# Patient Record
Sex: MALE | Race: WHITE | NOT HISPANIC OR LATINO | Employment: FULL TIME | URBAN - METROPOLITAN AREA
[De-identification: names, ages, dates, MRNs, and addresses within clinical notes are randomized per-mention and may not be internally consistent; named-entity substitution may affect disease eponyms.]

---

## 2021-08-21 ENCOUNTER — HOSPITAL ENCOUNTER (EMERGENCY)
Facility: CLINIC | Age: 51
Discharge: HOME | End: 2021-08-21
Attending: EMERGENCY MEDICINE | Admitting: EMERGENCY MEDICINE
Payer: COMMERCIAL

## 2021-08-21 VITALS
OXYGEN SATURATION: 100 % | BODY MASS INDEX: 24.36 KG/M2 | WEIGHT: 200 LBS | TEMPERATURE: 98.5 F | HEART RATE: 77 BPM | DIASTOLIC BLOOD PRESSURE: 81 MMHG | HEIGHT: 76 IN | SYSTOLIC BLOOD PRESSURE: 123 MMHG | RESPIRATION RATE: 16 BRPM

## 2021-08-21 DIAGNOSIS — S81.811A LACERATION OF RIGHT LOWER EXTREMITY, INITIAL ENCOUNTER: ICD-10-CM

## 2021-08-21 PROCEDURE — 12001 RPR S/N/AX/GEN/TRNK 2.5CM/<: CPT

## 2021-08-21 PROCEDURE — 99283 EMERGENCY DEPT VISIT LOW MDM: CPT

## 2021-08-21 ASSESSMENT — MIFFLIN-ST. JEOR: SCORE: 1873.69

## 2021-08-21 ASSESSMENT — ENCOUNTER SYMPTOMS
WOUND: 1
JOINT SWELLING: 0
NUMBNESS: 0
FEVER: 0

## 2021-08-22 NOTE — ED NOTES
Emergency Department Technician Wound Irrigation Note:    8/21/2021    9:38 PM      Wound location:  Right shin    Irrigation Fluid: Normal Saline    Estimated Irrigation Volume (60 mL fluid per cm): 240 mL    KELLI LOVE

## 2021-08-22 NOTE — ED PROVIDER NOTES
"  History   Chief Complaint:  Fall and Leg Injury     The history is provided by the patient.      Emre Flores is a 51 year old male who presents with abrasions following a fall while hiking at [employer]. He explains that he was following his 10 year old son onto some rocks and stepped on a slippery stone that caused him to fall about a foot. The abrasions stopped bleeding within a minute. This happened around 2-2:30 PM this afternoon. He was able to walk around for another 45 minutes and drive home and shower following the fall. When he got home, he bandaged it following his shower and the abrasions soaked through the bandage. He was experiencing some pain from the ankle, up the shin, to the knee, but feels okay upon evaluation in the ED. He denies any head trauma. He does not use any blood thinners.    The patient states that his tetanus is up to date.      Review of Systems   Constitutional: Negative for fever.   Musculoskeletal: Negative for joint swelling.   Skin: Positive for wound.   Neurological: Negative for numbness.     Allergies:  No Known Allergies    Medications:  The patient is not currently taking any daily medications.    Past Medical History:    The patient is healthy and did not have any past medical history to report.     Social History:  Presents alone  No known history of tobacco or alcohol use    Physical Exam     Patient Vitals for the past 24 hrs:   BP Temp Temp src Pulse Resp SpO2 Height Weight   08/21/21 2105 123/81 98.5  F (36.9  C) Temporal 77 16 100 % 1.93 m (6' 4\") 90.7 kg (200 lb)       Physical Exam  General: Alert, interactive.   Head:  Scalp is atraumatic  Eyes:  The pupils are equal, round, and reactive to light    EOM's intact    No scleral icterus  ENT:      Nose:  The external nose is normal  Ears:  External ears are normal  Mouth/Throat: The oropharynx is normal    Mucus membranes are moist      Neck:  Normal range of motion.      There is no rigidity.    Trachea is in " the midline         CV:  Regular rate and rhythm    No murmur   Resp:  Breath sounds are clear bilaterally    Non-labored, no retractions or accessory muscle use      MS:  Normal strength in all 4 extremities  Skin:  Warm and dry, No rash or lesions noted. Abrasion to the right knee, superficial. Abrasion with a 1 cm laceration to the right anterior tibial ridge.    Neuro: Strength 5/5 x4.  Sensation intact in the right lower extremity.      GCS: 15  Psych:  Awake. Alert.  Normal affect.      Appropriate interactions.    Emergency Department Course     Procedures    Laceration Repair        LACERATION:  A simple and superficial clean 1 cm laceration.      LOCATION:  right anterior tibial ridge      FUNCTION:  Distally sensation, circulation, motor and tendon function are intact.      ANESTHESIA:  Local using 0.5% bupivacaine total of 5 mLs      PREPARATION:  Irrigation with Normal Saline and Shur Clens      DEBRIDEMENT:  no debridement      CLOSURE:  Wound was closed with One Layer.  Skin closed with 1 x 4.0 Ethilon using horizontal mattress sutures.    Emergency Department Course:    Reviewed:  I reviewed nursing notes, vitals and past medical history.     Assessments:  2121 I obtained history and examined the patient as noted above.   2148 I rechecked the patient and explained findings.    Disposition:  The patient was discharged to home.     Impression & Plan     Medical Decision Making:  The patient presented with a laceration.  The wound was carefully evaluated and explored.  The laceration was closed with sutures as noted above.  There is no evidence of muscular, tendon, or bony damage with this laceration.  Possible complications (infection, scarring) were reviewed with the patient.  Follow up with primary care will be indicated for suture removal as noted in the discharge section.  I also discussed signs of infection instructed the patient to return promptly to the ER for re-evaluation should any of these  develop.    Diagnosis:    ICD-10-CM    1. Laceration of right lower extremity, initial encounter  S81.811A      Scribe Disclosure:  I, Carrie Morales, am serving as a scribe at 9:21 PM on 8/21/2021 to document services personally performed by Hubert Watts MD based on my observations and the provider's statements to me.          Hubert Watts MD  08/21/21 [id number]